# Patient Record
Sex: MALE | Race: AMERICAN INDIAN OR ALASKA NATIVE | Employment: STUDENT | URBAN - METROPOLITAN AREA
[De-identification: names, ages, dates, MRNs, and addresses within clinical notes are randomized per-mention and may not be internally consistent; named-entity substitution may affect disease eponyms.]

---

## 2022-05-10 ENCOUNTER — HOSPITAL ENCOUNTER (EMERGENCY)
Age: 22
Discharge: HOME | End: 2022-05-10
Attending: EMERGENCY MEDICINE
Payer: COMMERCIAL

## 2022-05-10 ENCOUNTER — APPOINTMENT (OUTPATIENT)
Dept: GENERAL RADIOLOGY | Age: 22
End: 2022-05-10
Attending: EMERGENCY MEDICINE
Payer: COMMERCIAL

## 2022-05-10 VITALS
RESPIRATION RATE: 16 BRPM | DIASTOLIC BLOOD PRESSURE: 69 MMHG | OXYGEN SATURATION: 99 % | HEART RATE: 87 BPM | WEIGHT: 158.29 LBS | TEMPERATURE: 98.7 F | SYSTOLIC BLOOD PRESSURE: 128 MMHG

## 2022-05-10 DIAGNOSIS — R50.9 ACUTE FEBRILE ILLNESS: Primary | ICD-10-CM

## 2022-05-10 DIAGNOSIS — B34.9 VIRAL SYNDROME: ICD-10-CM

## 2022-05-10 LAB
FLUAV AG NPH QL IA: NEGATIVE
FLUBV AG NOSE QL IA: NEGATIVE
S PYO AG THROAT QL: NEGATIVE

## 2022-05-10 PROCEDURE — 87804 INFLUENZA ASSAY W/OPTIC: CPT

## 2022-05-10 PROCEDURE — 87880 STREP A ASSAY W/OPTIC: CPT

## 2022-05-10 PROCEDURE — 87070 CULTURE OTHR SPECIMN AEROBIC: CPT

## 2022-05-10 PROCEDURE — 99284 EMERGENCY DEPT VISIT MOD MDM: CPT

## 2022-05-10 PROCEDURE — 71046 X-RAY EXAM CHEST 2 VIEWS: CPT

## 2022-05-10 RX ORDER — DEXTROAMPHETAMINE SACCHARATE, AMPHETAMINE ASPARTATE MONOHYDRATE, DEXTROAMPHETAMINE SULFATE AND AMPHETAMINE SULFATE 3.75; 3.75; 3.75; 3.75 MG/1; MG/1; MG/1; MG/1
15 CAPSULE, EXTENDED RELEASE ORAL
COMMUNITY

## 2022-05-10 NOTE — Clinical Note
Kermitt Sicard was seen and treated in our emergency department on 5/10/2022. Please excuse from school until fever free for 24 hours without any tylenol or ibuprofen. There is a covid pcr test pending and that needs to result too. If positive, he will need to follow the school policy regarding covid too.       Claudia Mccullough MD

## 2022-05-10 NOTE — ED TRIAGE NOTES
Triage: 2 days ago started with neck pain, sore throat, chills. No fever. Yesterday morning c/o lower back pain. Seen @ [facility] strep/flu/covid negative. Normal labs. Temperature 105. Took 1g tylenol around 5am. No motrin.

## 2022-05-10 NOTE — DISCHARGE INSTRUCTIONS
Followup on the covid pcr test done at [facility]    Remain on quarantine until the covid test results. Follow the covid isolation instructions. If fever persists for 5 days, followup with your doctor. If worsening symptoms, follow-up sooner with a physician.

## 2022-05-10 NOTE — ED PROVIDER NOTES
HPI     Please note that this dictation was completed with [employer], the computer voice recognition software. Quite often unanticipated grammatical, syntax, homophones, and other interpretive errors are inadvertently transcribed by the computer software. Please disregard these errors. Please excuse any errors that have escaped final proofreading. 23 yo male with history of ADD here with fever onset 2 days ago. Patient states 2 days ago that he felt warm and chills. He had back pain, neck pain, headache, congestion with associated sore throat. He was seen at [facility] emergency department yesterday morning and states that he was only given ibuprofen. [facility] went to [facility].  He had a negative rapid COVID and if COVID PCR is pending. Negative rapid strep and negative rapid flu. He states that at 5 AM this morning that he awoke with chills and temp was 105. He took 1000 mg of Tylenol with relief. He complained of back pain, neck pain, headache, sore throat and congestion persisting. He states that he can move his neck well now and it is not stiff. Denies cough. He states that the body aches have all improved as well as a headache with the Tylenol. Denies urinary complaints, abdominal pain, vomiting, diarrhea, rash, other complaints. Social history: Immunizations up-to-date. He has been around others sick with a fever. Vaccinated for COVID x3 including the booster. Past Medical History:   Diagnosis Date    ADD (attention deficit disorder)        History reviewed. No pertinent surgical history. History reviewed. No pertinent family history.     Social History     Socioeconomic History    Marital status: SINGLE     Spouse name: Not on file    Number of children: Not on file    Years of education: Not on file    Highest education level: Not on file   Occupational History    Not on file   Tobacco Use    Smoking status: Never Smoker    Smokeless tobacco: Never Used   Substance and Sexual Activity    Alcohol use: Not on file    Drug use: Yes     Types: Marijuana    Sexual activity: Not on file   Other Topics Concern    Not on file   Social History Narrative    Not on file     Social Determinants of Health     Financial Resource Strain:     Difficulty of Paying Living Expenses: Not on file   Food Insecurity:     Worried About Running Out of Food in the Last Year: Not on file    Hugh of Food in the Last Year: Not on file   Transportation Needs:     Lack of Transportation (Medical): Not on file    Lack of Transportation (Non-Medical): Not on file   Physical Activity:     Days of Exercise per Week: Not on file    Minutes of Exercise per Session: Not on file   Stress:     Feeling of Stress : Not on file   Social Connections:     Frequency of Communication with Friends and Family: Not on file    Frequency of Social Gatherings with Friends and Family: Not on file    Attends [employer] Services: Not on file    Active Member of [address] [place] [address] or Organizations: Not on file    Attends Club or Organization Meetings: Not on file    Marital Status: Not on file   Intimate Partner Violence:     Fear of Current or Ex-Partner: Not on file    Emotionally Abused: Not on file    Physically Abused: Not on file    Sexually Abused: Not on file   Housing Stability:     Unable to Pay for Housing in the Last Year: Not on file    Number of [place] in the Last Year: Not on file    Unstable Housing in the Last Year: Not on file         ALLERGIES: Penicillins    Review of Systems   Constitutional: Negative for chills and fever. HENT: Positive for congestion, rhinorrhea and sore throat. Eyes: Negative for photophobia and visual disturbance. Respiratory: Negative for cough and shortness of breath. Cardiovascular: Negative for chest pain. Gastrointestinal: Negative for abdominal pain, diarrhea, nausea and vomiting. Genitourinary: Negative for dysuria, frequency and urgency.    Musculoskeletal: Positive for back pain and neck pain. Negative for neck stiffness. Skin: Negative for rash. Neurological: Positive for headaches. All other systems reviewed and are negative. Vitals:    05/10/22 0711 05/10/22 0719   BP:  128/69   Pulse:  87   Resp:  16   Temp:  98.7 °F (37.1 °C)   SpO2:  99%   Weight: 71.8 kg (158 lb 4.6 oz)             Physical Exam  Vitals and nursing note reviewed. Constitutional:       Appearance: Normal appearance. He is well-developed. HENT:      Head: Normocephalic and atraumatic. Nose: No congestion or rhinorrhea. Mouth/Throat:      Mouth: Mucous membranes are moist.      Pharynx: No oropharyngeal exudate. Eyes:      General: No scleral icterus. Right eye: No discharge. Left eye: No discharge. Conjunctiva/sclera: Conjunctivae normal.      Pupils: Pupils are equal, round, and reactive to light. Cardiovascular:      Rate and Rhythm: Normal rate and regular rhythm. Heart sounds: Normal heart sounds. No murmur heard. No friction rub. No gallop. Pulmonary:      Effort: Pulmonary effort is normal. No respiratory distress. Breath sounds: Normal breath sounds. No wheezing or rales. Abdominal:      General: Bowel sounds are normal. There is no distension. Palpations: Abdomen is soft. Tenderness: There is no abdominal tenderness. There is no guarding. Musculoskeletal:         General: No tenderness. Normal range of motion. Cervical back: Normal range of motion and neck supple. Lymphadenopathy:      Cervical: No cervical adenopathy. Skin:     General: Skin is warm and dry. Coloration: Skin is not pale. Findings: No rash. Neurological:      Mental Status: He is alert and oriented to person, place, and time. Cranial Nerves: No cranial nerve deficit. Coordination: Coordination normal.              MDM   64-year-old male here with congestion, body aches and fever. Currently afebrile.   Sats normal. He does have some back pain so will evaluate for pneumonia. He has no urinary complaints. His body aches are more diffuse. He has no meningismus on exam.  Very low clinical suspicion for meningitis at this point. Differential diagnosis includes viral syndrome, flu, strep, pneumonia and others. [facility] PCR [facility].  Will check repeat flu and strep. Check chest x-ray. Procedures        9:06 AM  Patient's results have been reviewed with them. Patient and/or family have verbally conveyed their understanding and agreement of the patient's signs, symptoms, diagnosis, treatment and prognosis and additionally agree to follow up as recommended or return to the Emergency Room should their condition change prior to follow-up. Discharge instructions have also been provided to the patient with some educational information regarding their diagnosis as well a list of reasons why they would want to return to the ER prior to their follow-up appointment should their condition change. Recent Results (from the past 24 hour(s))   INFLUENZA A+B VIRAL AGS    Collection Time: 05/10/22  7:40 AM   Result Value Ref Range    Influenza A Antigen Negative NEG      Influenza B Antigen Negative NEG     POC GROUP A STREP    Collection Time: 05/10/22  7:55 AM   Result Value Ref Range    Group A strep (POC) Negative NEG         XR CHEST PA LAT    Result Date: 5/10/2022  EXAM: XR CHEST PA LAT INDICATION: high fever, thoracic back pain, evaluation for pneumonia COMPARISON: None. FINDINGS: PA and lateral radiographs of the chest demonstrate clear lungs. The cardiac and mediastinal contours and pulmonary vascularity are normal. The bones and soft tissues are within normal limits.      Normal chest.

## 2022-05-10 NOTE — Clinical Note
Zahra Jacobs was seen and treated in our emergency department on 5/10/2022. Please excuse from school until fever free for 24 hours without any tylenol or ibuprofen. There is a covid pcr test pending and that needs to result too. If positive, he will need to follow the school policy regarding covid too.       Yoav Ray MD

## 2022-05-12 ENCOUNTER — HOSPITAL ENCOUNTER (EMERGENCY)
Age: 22
Discharge: STILL ADMITTED OR ADMINISTRATIVE | End: 2022-05-12

## 2022-05-12 LAB
BACTERIA SPEC CULT: NORMAL
SERVICE CMNT-IMP: NORMAL